# Patient Record
(demographics unavailable — no encounter records)

---

## 2025-01-31 NOTE — ASSESSMENT
[FreeTextEntry1] : , ,  Preventative: Counseled on health promotion and disease prevention. EKG and wellness labs done Discussed routine immunizations Heart Screen:  EKG nsr Follow-up with OBGYN for Annual Well woman exam / Pap  Peanuts allergy: refill EpiPen  Asthma: refill Albuterol